# Patient Record
Sex: FEMALE | Race: WHITE | NOT HISPANIC OR LATINO | Employment: OTHER | URBAN - METROPOLITAN AREA
[De-identification: names, ages, dates, MRNs, and addresses within clinical notes are randomized per-mention and may not be internally consistent; named-entity substitution may affect disease eponyms.]

---

## 2021-01-15 DIAGNOSIS — Z23 NEED FOR VACCINATION: ICD-10-CM

## 2022-10-31 ENCOUNTER — HOSPITAL ENCOUNTER (OUTPATIENT)
Facility: MEDICAL CENTER | Age: 82
End: 2022-11-01
Attending: EMERGENCY MEDICINE | Admitting: HOSPITALIST
Payer: COMMERCIAL

## 2022-10-31 DIAGNOSIS — R55 SYNCOPE, UNSPECIFIED SYNCOPE TYPE: ICD-10-CM

## 2022-10-31 PROBLEM — E86.0 DEHYDRATION: Status: ACTIVE | Noted: 2022-10-31

## 2022-10-31 PROBLEM — N30.00 ACUTE CYSTITIS WITHOUT HEMATURIA: Status: ACTIVE | Noted: 2022-10-31

## 2022-10-31 LAB
ALBUMIN SERPL BCP-MCNC: 3.9 G/DL (ref 3.2–4.9)
ALBUMIN/GLOB SERPL: 1.5 G/DL
ALP SERPL-CCNC: 73 U/L (ref 30–99)
ALT SERPL-CCNC: <5 U/L (ref 2–50)
ANION GAP SERPL CALC-SCNC: 12 MMOL/L (ref 7–16)
APPEARANCE UR: ABNORMAL
AST SERPL-CCNC: 15 U/L (ref 12–45)
BACTERIA #/AREA URNS HPF: ABNORMAL /HPF
BASOPHILS # BLD AUTO: 1.1 % (ref 0–1.8)
BASOPHILS # BLD: 0.08 K/UL (ref 0–0.12)
BILIRUB SERPL-MCNC: 0.3 MG/DL (ref 0.1–1.5)
BILIRUB UR QL STRIP.AUTO: NEGATIVE
BUN SERPL-MCNC: 8 MG/DL (ref 8–22)
CALCIUM SERPL-MCNC: 9.2 MG/DL (ref 8.4–10.2)
CHLORIDE SERPL-SCNC: 103 MMOL/L (ref 96–112)
CO2 SERPL-SCNC: 23 MMOL/L (ref 20–33)
COLOR UR: YELLOW
CREAT SERPL-MCNC: 0.89 MG/DL (ref 0.5–1.4)
EKG IMPRESSION: NORMAL
EOSINOPHIL # BLD AUTO: 0.17 K/UL (ref 0–0.51)
EOSINOPHIL NFR BLD: 2.3 % (ref 0–6.9)
EPI CELLS #/AREA URNS HPF: ABNORMAL /HPF
ERYTHROCYTE [DISTWIDTH] IN BLOOD BY AUTOMATED COUNT: 45.6 FL (ref 35.9–50)
GFR SERPLBLD CREATININE-BSD FMLA CKD-EPI: 64 ML/MIN/1.73 M 2
GLOBULIN SER CALC-MCNC: 2.6 G/DL (ref 1.9–3.5)
GLUCOSE SERPL-MCNC: 105 MG/DL (ref 65–99)
GLUCOSE UR STRIP.AUTO-MCNC: NEGATIVE MG/DL
HCT VFR BLD AUTO: 40.2 % (ref 37–47)
HGB BLD-MCNC: 13 G/DL (ref 12–16)
HYALINE CASTS #/AREA URNS LPF: ABNORMAL /LPF
IMM GRANULOCYTES # BLD AUTO: 0.01 K/UL (ref 0–0.11)
IMM GRANULOCYTES NFR BLD AUTO: 0.1 % (ref 0–0.9)
KETONES UR STRIP.AUTO-MCNC: ABNORMAL MG/DL
LEUKOCYTE ESTERASE UR QL STRIP.AUTO: ABNORMAL
LYMPHOCYTES # BLD AUTO: 2.52 K/UL (ref 1–4.8)
LYMPHOCYTES NFR BLD: 34.2 % (ref 22–41)
MCH RBC QN AUTO: 29 PG (ref 27–33)
MCHC RBC AUTO-ENTMCNC: 32.3 G/DL (ref 33.6–35)
MCV RBC AUTO: 89.7 FL (ref 81.4–97.8)
MICRO URNS: ABNORMAL
MONOCYTES # BLD AUTO: 0.55 K/UL (ref 0–0.85)
MONOCYTES NFR BLD AUTO: 7.5 % (ref 0–13.4)
MUCOUS THREADS #/AREA URNS HPF: ABNORMAL /HPF
NEUTROPHILS # BLD AUTO: 4.03 K/UL (ref 2–7.15)
NEUTROPHILS NFR BLD: 54.8 % (ref 44–72)
NITRITE UR QL STRIP.AUTO: NEGATIVE
NRBC # BLD AUTO: 0 K/UL
NRBC BLD-RTO: 0 /100 WBC
PH UR STRIP.AUTO: 6 [PH] (ref 5–8)
PLATELET # BLD AUTO: 315 K/UL (ref 164–446)
PMV BLD AUTO: 8.8 FL (ref 9–12.9)
POTASSIUM SERPL-SCNC: 3.8 MMOL/L (ref 3.6–5.5)
PROT SERPL-MCNC: 6.5 G/DL (ref 6–8.2)
PROT UR QL STRIP: 30 MG/DL
RBC # BLD AUTO: 4.48 M/UL (ref 4.2–5.4)
RBC # URNS HPF: ABNORMAL /HPF
RBC UR QL AUTO: NEGATIVE
SODIUM SERPL-SCNC: 138 MMOL/L (ref 135–145)
SP GR UR STRIP.AUTO: 1.02
TROPONIN T SERPL-MCNC: <6 NG/L (ref 6–19)
WBC # BLD AUTO: 7.4 K/UL (ref 4.8–10.8)
WBC #/AREA URNS HPF: ABNORMAL /HPF

## 2022-10-31 PROCEDURE — 93005 ELECTROCARDIOGRAM TRACING: CPT

## 2022-10-31 PROCEDURE — 81001 URINALYSIS AUTO W/SCOPE: CPT

## 2022-10-31 PROCEDURE — 36415 COLL VENOUS BLD VENIPUNCTURE: CPT

## 2022-10-31 PROCEDURE — G0378 HOSPITAL OBSERVATION PER HR: HCPCS

## 2022-10-31 PROCEDURE — 99220 PR INITIAL OBSERVATION CARE,LEVL III: CPT | Performed by: HOSPITALIST

## 2022-10-31 PROCEDURE — 80053 COMPREHEN METABOLIC PANEL: CPT

## 2022-10-31 PROCEDURE — A9270 NON-COVERED ITEM OR SERVICE: HCPCS | Performed by: HOSPITALIST

## 2022-10-31 PROCEDURE — 700102 HCHG RX REV CODE 250 W/ 637 OVERRIDE(OP): Performed by: HOSPITALIST

## 2022-10-31 PROCEDURE — 85025 COMPLETE CBC W/AUTO DIFF WBC: CPT

## 2022-10-31 PROCEDURE — 84484 ASSAY OF TROPONIN QUANT: CPT

## 2022-10-31 PROCEDURE — 94760 N-INVAS EAR/PLS OXIMETRY 1: CPT

## 2022-10-31 PROCEDURE — 99285 EMERGENCY DEPT VISIT HI MDM: CPT

## 2022-10-31 RX ORDER — ESCITALOPRAM OXALATE 10 MG/1
10 TABLET ORAL DAILY
COMMUNITY

## 2022-10-31 RX ORDER — CEPHALEXIN 250 MG/1
500 CAPSULE ORAL ONCE
Status: DISCONTINUED | OUTPATIENT
Start: 2022-10-31 | End: 2022-10-31

## 2022-10-31 RX ORDER — BISACODYL 10 MG
10 SUPPOSITORY, RECTAL RECTAL
Status: DISCONTINUED | OUTPATIENT
Start: 2022-10-31 | End: 2022-11-01 | Stop reason: HOSPADM

## 2022-10-31 RX ORDER — FENOFIBRATE 54 MG/1
54 TABLET ORAL DAILY
COMMUNITY

## 2022-10-31 RX ORDER — POLYETHYLENE GLYCOL 3350 17 G/17G
1 POWDER, FOR SOLUTION ORAL
Status: DISCONTINUED | OUTPATIENT
Start: 2022-10-31 | End: 2022-11-01 | Stop reason: HOSPADM

## 2022-10-31 RX ORDER — LISINOPRIL 2.5 MG/1
2.5 TABLET ORAL DAILY
COMMUNITY

## 2022-10-31 RX ORDER — ENOXAPARIN SODIUM 100 MG/ML
40 INJECTION SUBCUTANEOUS DAILY
Status: DISCONTINUED | OUTPATIENT
Start: 2022-11-01 | End: 2022-11-01 | Stop reason: HOSPADM

## 2022-10-31 RX ORDER — OMEPRAZOLE 20 MG/1
20 CAPSULE, DELAYED RELEASE ORAL DAILY
COMMUNITY

## 2022-10-31 RX ORDER — ATORVASTATIN CALCIUM 10 MG/1
10 TABLET, FILM COATED ORAL NIGHTLY
COMMUNITY

## 2022-10-31 RX ORDER — AMOXICILLIN 250 MG
2 CAPSULE ORAL 2 TIMES DAILY
Status: DISCONTINUED | OUTPATIENT
Start: 2022-10-31 | End: 2022-11-01 | Stop reason: HOSPADM

## 2022-10-31 RX ORDER — ALENDRONATE SODIUM 35 MG/1
35 TABLET ORAL
COMMUNITY

## 2022-10-31 RX ORDER — NITROFURANTOIN 25; 75 MG/1; MG/1
100 CAPSULE ORAL 2 TIMES DAILY WITH MEALS
Status: DISCONTINUED | OUTPATIENT
Start: 2022-10-31 | End: 2022-10-31

## 2022-10-31 RX ORDER — CEPHALEXIN 250 MG/1
500 CAPSULE ORAL EVERY 12 HOURS
Status: DISCONTINUED | OUTPATIENT
Start: 2022-10-31 | End: 2022-11-01 | Stop reason: HOSPADM

## 2022-10-31 RX ORDER — ACETAMINOPHEN 325 MG/1
650 TABLET ORAL EVERY 6 HOURS PRN
Status: DISCONTINUED | OUTPATIENT
Start: 2022-10-31 | End: 2022-11-01 | Stop reason: HOSPADM

## 2022-10-31 RX ORDER — CEFDINIR 300 MG/1
300 CAPSULE ORAL EVERY 12 HOURS
Status: DISCONTINUED | OUTPATIENT
Start: 2022-10-31 | End: 2022-10-31

## 2022-10-31 RX ADMIN — CEPHALEXIN 500 MG: 250 CAPSULE ORAL at 17:08

## 2022-10-31 ASSESSMENT — FIBROSIS 4 INDEX
FIB4 SCORE: 1.885618083164126732
FIB4 SCORE: 1.885618083164126732

## 2022-10-31 ASSESSMENT — COGNITIVE AND FUNCTIONAL STATUS - GENERAL
DAILY ACTIVITIY SCORE: 24
SUGGESTED CMS G CODE MODIFIER MOBILITY: CH
MOBILITY SCORE: 24
SUGGESTED CMS G CODE MODIFIER DAILY ACTIVITY: CH

## 2022-10-31 ASSESSMENT — LIFESTYLE VARIABLES
CONSUMPTION TOTAL: NEGATIVE
AVERAGE NUMBER OF DAYS PER WEEK YOU HAVE A DRINK CONTAINING ALCOHOL: 0
EVER FELT BAD OR GUILTY ABOUT YOUR DRINKING: NO
TOTAL SCORE: 0
HOW MANY TIMES IN THE PAST YEAR HAVE YOU HAD 5 OR MORE DRINKS IN A DAY: 0
ON A TYPICAL DAY WHEN YOU DRINK ALCOHOL HOW MANY DRINKS DO YOU HAVE: 0
ALCOHOL_USE: NO
HAVE YOU EVER FELT YOU SHOULD CUT DOWN ON YOUR DRINKING: NO
TOTAL SCORE: 0
EVER HAD A DRINK FIRST THING IN THE MORNING TO STEADY YOUR NERVES TO GET RID OF A HANGOVER: NO
HAVE PEOPLE ANNOYED YOU BY CRITICIZING YOUR DRINKING: NO
TOTAL SCORE: 0

## 2022-10-31 ASSESSMENT — ENCOUNTER SYMPTOMS
FEVER: 0
MYALGIAS: 0
VOMITING: 0
EYE DISCHARGE: 0
SHORTNESS OF BREATH: 0
EYE REDNESS: 0
FLANK PAIN: 0
FOCAL WEAKNESS: 0
ABDOMINAL PAIN: 0
CHILLS: 0
BRUISES/BLEEDS EASILY: 0
COUGH: 0
NERVOUS/ANXIOUS: 0
STRIDOR: 0

## 2022-10-31 ASSESSMENT — PATIENT HEALTH QUESTIONNAIRE - PHQ9
SUM OF ALL RESPONSES TO PHQ9 QUESTIONS 1 AND 2: 0
2. FEELING DOWN, DEPRESSED, IRRITABLE, OR HOPELESS: NOT AT ALL
1. LITTLE INTEREST OR PLEASURE IN DOING THINGS: NOT AT ALL

## 2022-10-31 NOTE — ASSESSMENT & PLAN NOTE
EKG shows sinus rhythm with a rate of 63, there is no ST elevation, or significant ST depression, QTC is 426.  Orthostatic vital  Telemetry monitor  Echocardiography

## 2022-10-31 NOTE — ED PROVIDER NOTES
ED Provider Note    CHIEF COMPLAINT  Chief Complaint   Patient presents with    Near Syncopal     Patient had brief episode of lightheaded/dizziness around 1400 today while shopping at a store with her son.       HPI  Myrtlesamuel Burnett is a 82 y.o. female who presents with chief complaint of brief episode of loss of consciousness.  Patient reports that this occurred around 2 PM while she was shopping.  Patient reports that she suddenly became lightheaded, felt like she was going to pass out, sat down and upon her son checking in on how she was doing patient had completely lost consciousness.  Patient denies any associated palpitations, nausea or diaphoresis.  She denies any associated chest pain.  Patient denies any preceding headache.  She denies any associated focal weakness or numbness.  She denies any dysuria urgency or frequency.  She denies any associated back pain.  Per son, he believes she is dehydrated as she does not drink much water.  She is currently also very stressed as son's wife  a week ago.    REVIEW OF SYSTEMS  ROS    See HPI for further details. All other systems are negative.     PAST MEDICAL HISTORY       SOCIAL HISTORY  Social History     Tobacco Use    Smoking status: Not on file    Smokeless tobacco: Not on file   Substance and Sexual Activity    Alcohol use: Not on file    Drug use: Not on file    Sexual activity: Not on file       SURGICAL HISTORY  patient denies any surgical history    CURRENT MEDICATIONS  Home Medications    **Home medications have not yet been reviewed for this encounter**         ALLERGIES  No Known Allergies    PHYSICAL EXAM  Vitals:    10/31/22 1511   BP: (!) 140/64   Pulse: 75   Resp: 17   Temp: 36.8 °C (98.3 °F)   SpO2: 94%       Physical Exam  Constitutional:       Appearance: She is well-developed.   HENT:      Head: Normocephalic and atraumatic.   Eyes:      Conjunctiva/sclera: Conjunctivae normal.   Cardiovascular:      Rate and Rhythm: Normal rate and  regular rhythm.   Pulmonary:      Effort: Pulmonary effort is normal.      Breath sounds: Normal breath sounds.   Abdominal:      General: Bowel sounds are normal. There is no distension.      Palpations: Abdomen is soft.      Tenderness: There is no abdominal tenderness. There is no rebound.   Musculoskeletal:      Cervical back: Normal range of motion and neck supple.   Skin:     General: Skin is warm and dry.      Findings: No rash.   Neurological:      General: No focal deficit present.      Mental Status: She is alert and oriented to person, place, and time.      Comments: Distal and proximal strength 5/5 in upper and lower extremities. No dysmetria. No sensation deficits. No visual field deficits. Cranial nerves intact.        Psychiatric:         Behavior: Behavior normal.         DIAGNOSTIC STUDIES / PROCEDURES      LABS  Results for orders placed or performed during the hospital encounter of 10/31/22   CBC WITH DIFFERENTIAL   Result Value Ref Range    WBC 7.4 4.8 - 10.8 K/uL    RBC 4.48 4.20 - 5.40 M/uL    Hemoglobin 13.0 12.0 - 16.0 g/dL    Hematocrit 40.2 37.0 - 47.0 %    MCV 89.7 81.4 - 97.8 fL    MCH 29.0 27.0 - 33.0 pg    MCHC 32.3 (L) 33.6 - 35.0 g/dL    RDW 45.6 35.9 - 50.0 fL    Platelet Count 315 164 - 446 K/uL    MPV 8.8 (L) 9.0 - 12.9 fL    Neutrophils-Polys 54.80 44.00 - 72.00 %    Lymphocytes 34.20 22.00 - 41.00 %    Monocytes 7.50 0.00 - 13.40 %    Eosinophils 2.30 0.00 - 6.90 %    Basophils 1.10 0.00 - 1.80 %    Immature Granulocytes 0.10 0.00 - 0.90 %    Nucleated RBC 0.00 /100 WBC    Neutrophils (Absolute) 4.03 2.00 - 7.15 K/uL    Lymphs (Absolute) 2.52 1.00 - 4.80 K/uL    Monos (Absolute) 0.55 0.00 - 0.85 K/uL    Eos (Absolute) 0.17 0.00 - 0.51 K/uL    Baso (Absolute) 0.08 0.00 - 0.12 K/uL    Immature Granulocytes (abs) 0.01 0.00 - 0.11 K/uL    NRBC (Absolute) 0.00 K/uL   CMP   Result Value Ref Range    Sodium 138 135 - 145 mmol/L    Potassium 3.8 3.6 - 5.5 mmol/L    Chloride 103 96 - 112  mmol/L    Co2 23 20 - 33 mmol/L    Anion Gap 12.0 7.0 - 16.0    Glucose 105 (H) 65 - 99 mg/dL    Bun 8 8 - 22 mg/dL    Creatinine 0.89 0.50 - 1.40 mg/dL    Calcium 9.2 8.4 - 10.2 mg/dL    AST(SGOT) 15 12 - 45 U/L    ALT(SGPT) <5 2 - 50 U/L    Alkaline Phosphatase 73 30 - 99 U/L    Total Bilirubin 0.3 0.1 - 1.5 mg/dL    Albumin 3.9 3.2 - 4.9 g/dL    Total Protein 6.5 6.0 - 8.2 g/dL    Globulin 2.6 1.9 - 3.5 g/dL    A-G Ratio 1.5 g/dL   TROPONIN   Result Value Ref Range    Troponin T <6 6 - 19 ng/L   URINALYSIS    Specimen: Urine   Result Value Ref Range    Color Yellow     Character Hazy (A)     Specific Gravity 1.020 <1.035    Ph 6.0 5.0 - 8.0    Glucose Negative Negative mg/dL    Ketones Trace (A) Negative mg/dL    Protein 30 (A) Negative mg/dL    Bilirubin Negative Negative    Nitrite Negative Negative    Leukocyte Esterase Small (A) Negative    Occult Blood Negative Negative    Micro Urine Req Microscopic    URINE MICROSCOPIC (W/UA)   Result Value Ref Range    WBC 20-50 (A) /hpf    RBC 0-2 /hpf    Bacteria Rare (A) None /hpf    Epithelial Cells Few Few /hpf    Mucous Threads Few /hpf    Hyaline Cast 0-2 /lpf   ESTIMATED GFR   Result Value Ref Range    GFR (CKD-EPI) 64 >60 mL/min/1.73 m 2   EKG   Result Value Ref Range    Report       [facility] Emergency Dept[facility]    Test Date:  2022-10-31  Pt Name:    ASH NIÑO               Department: [facility]  MRN:        [id number]                      [facility]  Gender:     Female                       Technician: AT  :        [id number]                   Requested By:ER TRIAGE PROTOCOL  Order #:    [id number]                    Reading MD: Tomas Villegas MD    Measurements  Intervals                                Axis  Rate:       63                           P:          50  NY:         148                          QRS:        22  QRSD:       101                          T:          23  QT:         416  QTc:        426    Interpretive  Statements  Sinus rhythm  No previous ECG available for comparison  Electronically Signed On 10- 16:12:27 PDT by Tomas Villegas MD           RADIOLOGY  EC-ECHOCARDIOGRAM COMPLETE W/O CONT    (Results Pending)           COURSE & MEDICAL DECISION MAKING  Pertinent Labs & Imaging studies reviewed. (See chart for details)    Patient here with syncopal episode.  Unclear etiology.  The relatively sudden nature is concerning especially in the setting of patient's advanced age.  Patient is EKG is reassuring but certainly does not rule out cardiogenic syncope.  Patient neurologic exam is very reassuring, I believe a central nervous system cause of her symptoms is highly unlikely.  We will check basic labs including troponin.  Patient would likely benefit from admission for telemetry monitoring.  Basic labs are reassuring.  Patient case discussed with hospitalist who has agreed to admit.  Questionable urinary tract infection.  Will cover with Macrobid.  On further discussion with pharmacy, patient's GFR is not amenable for giving Macrobid and therefore will give Keflex.    FINAL IMPRESSION  1.  Syncope, urinary tract infection    Electronically signed by: Bakari Marin M.D., 10/31/2022 3:16 PM

## 2022-10-31 NOTE — ED TRIAGE NOTES
Patient presents via [facility] with reports of a brief episode of lightheaded/dizziness around 1400 today while at the [employer] store with her son. Patient is visiting from [facility]. Denies any LOC. Patient was assisted to the ground by her son. No injury. Patient currently denies symptoms and any pain.

## 2022-10-31 NOTE — H&P
Hospital Medicine History & Physical Note    Date of Service  10/31/2022    Primary Care Physician  Pcp Pt States None    Consultants  None     Code Status  Full Code    Chief Complaint  Chief Complaint   Patient presents with    Near Syncopal     Patient had brief episode of lightheaded/dizziness around 1400 today while shopping at a store with her son.     History of Presenting Illness  Myrtle Burnett is a 82 y.o. female with no significant past medical history who presented 10/31/2022 with syncope.  Patient had transient loss of consciousness while shopping around 2 PM on the day of admission.  The patient did feel lightheaded and dizzy before passing out.  She did not hit her head.  She denies having palpitations chest pain or shortness of breath prior to passing out.  Patient denies noticing any focal motor weakness or any other sensory changes.  Patient has not been drinking much according to patient's son, however, the patient has no nausea or vomiting.    I discussed the plan of care with emergency department physician, the patient and patient's son and grandson present at bedside in the emergency room.    Review of Systems  Review of Systems   Constitutional:  Negative for chills and fever.   Eyes:  Negative for discharge and redness.   Respiratory:  Negative for cough, shortness of breath and stridor.    Cardiovascular:  Negative for chest pain and leg swelling.        Syncope   Gastrointestinal:  Negative for abdominal pain and vomiting.   Genitourinary:  Negative for flank pain.   Musculoskeletal:  Negative for myalgias.   Skin: Negative.    Neurological:  Negative for focal weakness.   Endo/Heme/Allergies:  Does not bruise/bleed easily.   Psychiatric/Behavioral:  The patient is not nervous/anxious.      Past Medical History  A personal history of heart disease or diabetes    Surgical History  No prior surgeries    Family History  Heart disease in father    Social History  Not currently drinking  alcohol    Allergies  No Known Allergies    Medications  Prior to Admission Medications   Prescriptions Last Dose Informant Patient Reported? Taking?   alendronate (FOSAMAX) 35 MG tablet UNK at [facility] Patient's Home Pharmacy Yes Yes   Sig: Take 35 mg by mouth every 7 days.   atorvastatin (LIPITOR) 10 MG Tab UNK at [facility] Patient's Home Pharmacy Yes Yes   Sig: Take 10 mg by mouth every evening.   escitalopram (LEXAPRO) 10 MG Tab UNK at [facility] Patient's Home Pharmacy Yes Yes   Sig: Take 10 mg by mouth every day.   fenofibrate (TRICOR) 54 MG tablet UNK at [facility] Patient's Home Pharmacy Yes Yes   Sig: Take 54 mg by mouth every day.   lisinopril (PRINIVIL) 2.5 MG Tab UNK at [facility] Patient's Home Pharmacy Yes Yes   Sig: Take 2.5 mg by mouth every day.   omeprazole (PRILOSEC) 20 MG delayed-release capsule UNK at [facility] Patient's Home Pharmacy Yes Yes   Sig: Take 20 mg by mouth every day.      Facility-Administered Medications: None     Physical Exam  Temp:  [36.8 °C (98.3 °F)] 36.8 °C (98.3 °F)  Pulse:  [67-75] 74  Resp:  [13-17] 17  BP: (140)/(64) 140/64  SpO2:  [94 %-97 %] 97 %  Blood Pressure : (!) 140/64   Temperature: 36.8 °C (98.3 °F)   Pulse: 74   Respiration: 17   Pulse Oximetry: 97 %     Physical Exam  Constitutional:       General: She is not in acute distress.  HENT:      Head: Normocephalic and atraumatic.      Right Ear: External ear normal.      Left Ear: External ear normal.      Nose: No congestion or rhinorrhea.      Mouth/Throat:      Mouth: Mucous membranes are dry.      Pharynx: No oropharyngeal exudate or posterior oropharyngeal erythema.   Eyes:      General: No scleral icterus.        Right eye: No discharge.         Left eye: No discharge.      Conjunctiva/sclera: Conjunctivae normal.      Pupils: Pupils are equal, round, and reactive to light.   Cardiovascular:      Rate and Rhythm: Regular rhythm. Tachycardia present.      Heart sounds:     No friction rub. No gallop.   Pulmonary:      Effort: Pulmonary effort is  normal.   Abdominal:      General: Abdomen is flat. There is no distension.      Tenderness: There is no guarding.   Musculoskeletal:         General: No swelling.      Cervical back: Neck supple. No rigidity. No muscular tenderness.      Right lower leg: No edema.      Left lower leg: No edema.   Skin:     General: Skin is dry.      Capillary Refill: Capillary refill takes 2 to 3 seconds.      Coloration: Skin is not jaundiced or pale.      Findings: No bruising or erythema.   Neurological:      Mental Status: She is alert and oriented to person, place, and time.   Psychiatric:         Mood and Affect: Mood normal.         Judgment: Judgment normal.     Laboratory:  Recent Labs     10/31/22  1518   WBC 7.4   RBC 4.48   HEMOGLOBIN 13.0   HEMATOCRIT 40.2   MCV 89.7   MCH 29.0   MCHC 32.3*   RDW 45.6   PLATELETCT 315   MPV 8.8*     Recent Labs     10/31/22  1518   SODIUM 138   POTASSIUM 3.8   CHLORIDE 103   CO2 23   GLUCOSE 105*   BUN 8   CREATININE 0.89   CALCIUM 9.2     Recent Labs     10/31/22  1518   ASTSGOT 15   ALKPHOSPHAT 73   TBILIRUBIN 0.3   GLUCOSE 105*         No results for input(s): NTPROBNP in the last 72 hours.      Recent Labs     10/31/22  1518   TROPONINT <6     Imaging:  EC-ECHOCARDIOGRAM COMPLETE W/O CONT    (Results Pending)     I personally reviewed patient EKG shows sinus rhythm with a rate of 63, there is no ST elevation, or significant ST depression, QTC is 426.    Assessment/Plan:  Justification for Admission Status  I anticipate this patient is appropriate for observation status at this time because likely discharge after 1 midnight    Patient will need a bed on telemetry service, patient has syncope    Syncope- (present on admission)  Assessment & Plan  EKG shows sinus rhythm with a rate of 63, there is no ST elevation, or significant ST depression, QTC is 426.  Orthostatic vital  Telemetry monitor  Echocardiography     Dehydration- (present on admission)  Assessment & Plan  Likely  secondary to reduced oral intake.  Encourage oral intake as tolerated, antiemetics as needed.  Intravenous hydration until adequate oral intake is achieved    Acute cystitis without hematuria- (present on admission)  Assessment & Plan  Started on Keflex in the emergency room, I will continue with cefdinir for now follow on cultures and sensitivities    VTE prophylaxis: SCDs/TEDs and enoxaparin ppx

## 2022-10-31 NOTE — ED NOTES
Med Rec complete per pt's pharmacy  Allergies Reviewed    Pt unable to fully participate in interview

## 2022-10-31 NOTE — ASSESSMENT & PLAN NOTE
Started on Keflex in the emergency room, I will continue with cefdinir for now follow on cultures and sensitivities

## 2022-10-31 NOTE — ASSESSMENT & PLAN NOTE
Likely secondary to reduced oral intake.  Encourage oral intake as tolerated, antiemetics as needed.  Intravenous hydration until adequate oral intake is achieved

## 2022-11-01 ENCOUNTER — APPOINTMENT (OUTPATIENT)
Dept: CARDIOLOGY | Facility: MEDICAL CENTER | Age: 82
End: 2022-11-01
Attending: HOSPITALIST
Payer: COMMERCIAL

## 2022-11-01 ENCOUNTER — PHARMACY VISIT (OUTPATIENT)
Dept: PHARMACY | Facility: MEDICAL CENTER | Age: 82
End: 2022-11-01
Payer: COMMERCIAL

## 2022-11-01 VITALS
OXYGEN SATURATION: 94 % | HEIGHT: 62 IN | TEMPERATURE: 98.3 F | RESPIRATION RATE: 18 BRPM | DIASTOLIC BLOOD PRESSURE: 45 MMHG | BODY MASS INDEX: 20.24 KG/M2 | SYSTOLIC BLOOD PRESSURE: 136 MMHG | WEIGHT: 110.01 LBS | HEART RATE: 78 BPM

## 2022-11-01 LAB
ALBUMIN SERPL BCP-MCNC: 3.7 G/DL (ref 3.2–4.9)
ALBUMIN/GLOB SERPL: 1.5 G/DL
ALP SERPL-CCNC: 67 U/L (ref 30–99)
ALT SERPL-CCNC: <5 U/L (ref 2–50)
ANION GAP SERPL CALC-SCNC: 9 MMOL/L (ref 7–16)
AST SERPL-CCNC: 15 U/L (ref 12–45)
BILIRUB SERPL-MCNC: 0.4 MG/DL (ref 0.1–1.5)
BUN SERPL-MCNC: 9 MG/DL (ref 8–22)
CALCIUM SERPL-MCNC: 9.3 MG/DL (ref 8.4–10.2)
CHLORIDE SERPL-SCNC: 105 MMOL/L (ref 96–112)
CO2 SERPL-SCNC: 24 MMOL/L (ref 20–33)
CREAT SERPL-MCNC: 0.76 MG/DL (ref 0.5–1.4)
ERYTHROCYTE [DISTWIDTH] IN BLOOD BY AUTOMATED COUNT: 45.3 FL (ref 35.9–50)
GFR SERPLBLD CREATININE-BSD FMLA CKD-EPI: 77 ML/MIN/1.73 M 2
GLOBULIN SER CALC-MCNC: 2.5 G/DL (ref 1.9–3.5)
GLUCOSE SERPL-MCNC: 95 MG/DL (ref 65–99)
HCT VFR BLD AUTO: 38.9 % (ref 37–47)
HGB BLD-MCNC: 12.6 G/DL (ref 12–16)
LV EJECT FRACT  99904: 65
LV EJECT FRACT MOD 2C 99903: 69.49
LV EJECT FRACT MOD 4C 99902: 79.5
LV EJECT FRACT MOD BP 99901: 73.35
MAGNESIUM SERPL-MCNC: 2 MG/DL (ref 1.5–2.5)
MCH RBC QN AUTO: 28.9 PG (ref 27–33)
MCHC RBC AUTO-ENTMCNC: 32.4 G/DL (ref 33.6–35)
MCV RBC AUTO: 89.2 FL (ref 81.4–97.8)
PLATELET # BLD AUTO: 314 K/UL (ref 164–446)
PMV BLD AUTO: 8.9 FL (ref 9–12.9)
POTASSIUM SERPL-SCNC: 4.3 MMOL/L (ref 3.6–5.5)
PROT SERPL-MCNC: 6.2 G/DL (ref 6–8.2)
RBC # BLD AUTO: 4.36 M/UL (ref 4.2–5.4)
SODIUM SERPL-SCNC: 138 MMOL/L (ref 135–145)
WBC # BLD AUTO: 7.6 K/UL (ref 4.8–10.8)

## 2022-11-01 PROCEDURE — 700102 HCHG RX REV CODE 250 W/ 637 OVERRIDE(OP): Performed by: HOSPITALIST

## 2022-11-01 PROCEDURE — G0378 HOSPITAL OBSERVATION PER HR: HCPCS

## 2022-11-01 PROCEDURE — 36415 COLL VENOUS BLD VENIPUNCTURE: CPT

## 2022-11-01 PROCEDURE — 85027 COMPLETE CBC AUTOMATED: CPT

## 2022-11-01 PROCEDURE — 83735 ASSAY OF MAGNESIUM: CPT

## 2022-11-01 PROCEDURE — A9270 NON-COVERED ITEM OR SERVICE: HCPCS | Performed by: HOSPITALIST

## 2022-11-01 PROCEDURE — 99217 PR OBSERVATION CARE DISCHARGE: CPT | Performed by: INTERNAL MEDICINE

## 2022-11-01 PROCEDURE — 93306 TTE W/DOPPLER COMPLETE: CPT

## 2022-11-01 PROCEDURE — 93306 TTE W/DOPPLER COMPLETE: CPT | Mod: 26 | Performed by: INTERNAL MEDICINE

## 2022-11-01 PROCEDURE — 80053 COMPREHEN METABOLIC PANEL: CPT

## 2022-11-01 PROCEDURE — RXMED WILLOW AMBULATORY MEDICATION CHARGE: Performed by: INTERNAL MEDICINE

## 2022-11-01 RX ORDER — CEPHALEXIN 500 MG/1
500 CAPSULE ORAL EVERY 12 HOURS
Qty: 4 CAPSULE | Refills: 0 | Status: SHIPPED | OUTPATIENT
Start: 2022-11-01 | End: 2022-11-03

## 2022-11-01 RX ADMIN — CEPHALEXIN 500 MG: 250 CAPSULE ORAL at 05:52

## 2022-11-01 RX ADMIN — ACETAMINOPHEN 650 MG: 325 TABLET, FILM COATED ORAL at 07:55

## 2022-11-01 RX ADMIN — SENNOSIDES AND DOCUSATE SODIUM 2 TABLET: 50; 8.6 TABLET ORAL at 05:52

## 2022-11-01 NOTE — PROGRESS NOTES
Discharge instructions given and discussed, signed copy in chart. Pt verbalized understanding and all questions answered. New prescriptions discussed. Pt discharged home in stable condition via wheelchair escorted by family. Personal belongings taken home with patient. IV removed and tolerated well. Tele box removed, monitor tech notified.

## 2022-11-01 NOTE — CARE PLAN
The patient is Stable - Low risk of patient condition declining or worsening    Shift Goals  Clinical Goals: ECHO  Patient Goals: rest    Progress made toward(s) clinical / shift goals:    Problem: Knowledge Deficit - Standard  Goal: Patient and family/care givers will demonstrate understanding of plan of care, disease process/condition, diagnostic tests and medications  Outcome: Progressing  Note: Plan of care, medications, and diagnostic tests discussed with patient and son at bedside. All questions answered at this time.        Patient is not progressing towards the following goals:NA       Home

## 2022-11-01 NOTE — PROGRESS NOTES
Received bedside report from JUAN CARLOS Horton, pt care assumed. VSS, pt assessment complete. Pt A&Ox4, c/o 5/10 head pain at this time. POC discussed with pt and verbalizes no questions. Pt denies any additional needs at this time. Bed locked and in lowest position, bed alarm off. Pt educated on fall risk and verbalized understanding, call light within reach, hourly rounding initiated.

## 2022-11-01 NOTE — PROGRESS NOTES
Received report from JUAN CARLOS Iqbal. Patient care assumed and assessment completed. Patient's son at bedside and patient resting in bed. Patient has no additional needs and all questions answered at this time.

## 2022-11-01 NOTE — PROGRESS NOTES
Telemetry Shift Summary    Rhythm SR  HR Range 63-82  Measurements 0.18/0.08/0.40        Normal Values  Rhythm SR  HR Range    Measurements 0.12-0.20 / 0.06-0.10  / 0.30-0.52

## 2022-11-01 NOTE — DISCHARGE INSTRUCTIONS
Please follow up with your PCP as soon as possible. Please use urgent and emergency care as needed until able to follow up with PCP.

## 2022-11-01 NOTE — DISCHARGE SUMMARY
Discharge Summary    CHIEF COMPLAINT ON ADMISSION  Chief Complaint   Patient presents with    Near Syncopal     Patient had brief episode of lightheaded/dizziness around 1400 today while shopping at a store with her son.       Reason for Admission  EMS     Admission Date  10/31/2022    CODE STATUS  Full Code    HPI & HOSPITAL COURSE  Myrtle Burnett is a 82 y.o. female with heart disease, and diabetes, admitted 10/31/2022 with syncopal episode, and transient loss of consciousness while shopping.  She did not have any focal motor weakness or sensory changes.  Initial labs were unremarkable.  Troponin was negative.  Electrolytes and renal function are normal.  EKG showed no dynamic ischemic changes.  She did have evidence of UTI on urinalysis, for which she was started on Keflex.  Orthostatic vital signs were negative.  She was monitored on telemetry, without any evidence of arrhythmias.  Echocardiogram was also obtained which showed normal left ventricular systolic function, and no evidence of valvular abnormality.    She remained hemodynamically stable and afebrile.  Her syncope was felt to be related to dehydration. I counseled her extensively regarding keeping adequate oral fluid intake, and to drink at least 1 L of water a day.  I also counseled her against excessive soda intake.    I have personally seen and examined the patient on the day of discharge. With her clinical improvement, she was deemed ready to discharge from the hospital as she did not have any further hospitalization needs. Patient felt comfortable going home. The discharge plan was discussed with the patient, with which she was agreeable to.     Therefore, she is discharged in good and stable condition to home with close outpatient follow-up.      Discharge Date  11/1/2022    FOLLOW UP ITEMS POST DISCHARGE  -She will follow-up with her PCP.  -She is counseled extensively on maintaining adequate oral fluid intake.  - counseled to seek immediate  medical attention, or return to the ED for recurrent or worsening symptoms.      DISCHARGE DIAGNOSES  Principal Problem:    Syncope due to orthostasis/dehydration POA: Yes  Active Problems:    Acute cystitis without hematuria POA: Yes    Dehydration POA: Yes  Resolved Problems:    * No resolved hospital problems. *      FOLLOW UP  No future appointments.  No follow-up provider specified.    MEDICATIONS ON DISCHARGE     Medication List        START taking these medications        Instructions   cephALEXin 500 MG Caps  Commonly known as: KEFLEX   Take 1 Capsule by mouth every 12 hours for 2 days.  Dose: 500 mg            CONTINUE taking these medications        Instructions   alendronate 35 MG tablet  Commonly known as: FOSAMAX   Take 35 mg by mouth every 7 days.  Dose: 35 mg     atorvastatin 10 MG Tabs  Commonly known as: LIPITOR   Take 10 mg by mouth every evening.  Dose: 10 mg     escitalopram 10 MG Tabs  Commonly known as: Lexapro   Take 10 mg by mouth every day.  Dose: 10 mg     fenofibrate 54 MG tablet  Commonly known as: TRICOR   Take 54 mg by mouth every day.  Dose: 54 mg     lisinopril 2.5 MG Tabs  Commonly known as: PRINIVIL   Take 2.5 mg by mouth every day.  Dose: 2.5 mg     omeprazole 20 MG delayed-release capsule  Commonly known as: PRILOSEC   Take 20 mg by mouth every day.  Dose: 20 mg              Allergies  No Known Allergies    DIET  Orders Placed This Encounter   Procedures    Diet Order Diet: Cardiac     Standing Status:   Standing     Number of Occurrences:   1     Order Specific Question:   Diet:     Answer:   Cardiac [6]       ACTIVITY  As tolerated.  Weight bearing as tolerated    CONSULTATIONS  None    PROCEDURES  None    LABORATORY  Lab Results   Component Value Date    SODIUM 138 11/01/2022    POTASSIUM 4.3 11/01/2022    CHLORIDE 105 11/01/2022    CO2 24 11/01/2022    GLUCOSE 95 11/01/2022    BUN 9 11/01/2022    CREATININE 0.76 11/01/2022        Lab Results   Component Value Date    WBC 7.6  11/01/2022    HEMOGLOBIN 12.6 11/01/2022    HEMATOCRIT 38.9 11/01/2022    PLATELETCT 314 11/01/2022        Total time of the discharge process = 40 minutes.

## 2022-11-01 NOTE — PROGRESS NOTES
4 Eyes Skin Assessment Completed by Rasheed, RN and  , RN.    Head WDL  Ears WDL  Nose WDL  Mouth WDL  Neck WDL  Breast/Chest WDL  Shoulder Blades WDL  Spine WDL  (R) Arm/Elbow/Hand WDL  (L) Arm/Elbow/Hand WDL  Abdomen WDL  Groin WDL  Scrotum/Coccyx/Buttocks WDL  (R) Leg WDL  (L) Leg WDL  (R) Heel/Foot/Toe WDL  (L) Heel/Foot/Toe WDL      Interventions In Place     Possible Skin Injury     Pictures Uploaded Into Epic   Wound Consult Placed   RN Wound Prevention Protocol Ordered

## 2023-09-29 ENCOUNTER — OFFICE VISIT (OUTPATIENT)
Dept: URGENT CARE | Facility: PHYSICIAN GROUP | Age: 83
End: 2023-09-29
Payer: COMMERCIAL

## 2023-09-29 ENCOUNTER — APPOINTMENT (OUTPATIENT)
Dept: RADIOLOGY | Facility: IMAGING CENTER | Age: 83
End: 2023-09-29
Attending: FAMILY MEDICINE
Payer: COMMERCIAL

## 2023-09-29 VITALS
OXYGEN SATURATION: 95 % | BODY MASS INDEX: 20.5 KG/M2 | WEIGHT: 111.4 LBS | RESPIRATION RATE: 18 BRPM | HEIGHT: 62 IN | TEMPERATURE: 98.2 F | SYSTOLIC BLOOD PRESSURE: 128 MMHG | HEART RATE: 81 BPM | DIASTOLIC BLOOD PRESSURE: 72 MMHG

## 2023-09-29 DIAGNOSIS — S99.912A INJURY OF LEFT ANKLE, INITIAL ENCOUNTER: ICD-10-CM

## 2023-09-29 DIAGNOSIS — S93.402A SPRAIN OF LEFT ANKLE, UNSPECIFIED LIGAMENT, INITIAL ENCOUNTER: ICD-10-CM

## 2023-09-29 PROCEDURE — 3078F DIAST BP <80 MM HG: CPT | Performed by: FAMILY MEDICINE

## 2023-09-29 PROCEDURE — 3074F SYST BP LT 130 MM HG: CPT | Performed by: FAMILY MEDICINE

## 2023-09-29 PROCEDURE — 99203 OFFICE O/P NEW LOW 30 MIN: CPT | Performed by: FAMILY MEDICINE

## 2023-09-29 PROCEDURE — 73610 X-RAY EXAM OF ANKLE: CPT | Mod: TC,LT | Performed by: PHYSICIAN ASSISTANT

## 2023-09-29 RX ORDER — ESCITALOPRAM OXALATE 5 MG/1
5 TABLET ORAL DAILY
COMMUNITY
Start: 2023-07-28

## 2023-09-29 RX ORDER — DONEPEZIL HYDROCHLORIDE 5 MG/1
5 TABLET, FILM COATED ORAL
COMMUNITY
Start: 2023-08-29

## 2023-09-29 ASSESSMENT — ENCOUNTER SYMPTOMS
ROS SKIN COMMENTS: NO ABRASION OR LACERATION
SENSORY CHANGE: 0
FOCAL WEAKNESS: 0

## 2023-09-29 ASSESSMENT — FIBROSIS 4 INDEX: FIB4 SCORE: 1.91

## 2023-09-29 NOTE — PROGRESS NOTES
"Subjective     Myrtle Burnett is a 83 y.o. female who presents with Foot Injury (Left foot,redness,swollen x 2week)            2 weeks inversion injury left ankle.  Swelling persists.  Ambulatory with mild limp.  No prior injury.  Some relief with OTC medications.  No other aggravating alleviating factors.        Review of Systems   Skin:         No abrasion or laceration     Neurological:  Negative for sensory change and focal weakness.              Objective     /72 (BP Location: Right arm, Patient Position: Sitting, BP Cuff Size: Adult)   Pulse 81   Temp 36.8 °C (98.2 °F) (Temporal)   Resp 18   Ht 1.575 m (5' 2\")   Wt 50.5 kg (111 lb 6.4 oz)   SpO2 95%   BMI 20.38 kg/m²      Physical Exam  Constitutional:       Appearance: Normal appearance.   Musculoskeletal:      Comments: Lankle: tender lateral malleolus.  Laxity with anterior drawer.  Lateral soft tissue swelling. No other point bony tenderness of ankle, foot, or leg. Distal neuro/vascular intact. Skin intact.        Skin:     General: Skin is warm and dry.      Findings: No rash.   Neurological:      Mental Status: She is alert.                             Assessment & Plan      Xray: no fracture or dislocation per radiology     1. Injury of left ankle, initial encounter  DX-ANKLE 3+ VIEWS LEFT      2. Sprain of left ankle, unspecified ligament, initial encounter          Differential diagnosis, natural history, supportive care, and indications for immediate follow-up were discussed.     There is some ankle laxity on physical exam.  She lives in [place] and will seek follow-up orthopedic care there.    Brace placed.  Ice and Tylenol as needed.        "

## 2024-10-30 ENCOUNTER — APPOINTMENT (OUTPATIENT)
Dept: LAB | Facility: MEDICAL CENTER | Age: 84
End: 2024-10-30

## 2024-10-30 PROBLEM — K59.01 SLOW TRANSIT CONSTIPATION: Chronic | Status: ACTIVE | Noted: 2024-10-29

## 2024-10-30 PROBLEM — M81.0 AGE-RELATED OSTEOPOROSIS WITHOUT CURRENT PATHOLOGICAL FRACTURE: Chronic | Status: ACTIVE | Noted: 2024-10-29

## 2024-10-30 PROBLEM — G30.0 MODERATE EARLY ONSET ALZHEIMER'S DEMENTIA WITH ANXIETY (HCC): Status: ACTIVE | Noted: 2024-10-29

## 2024-10-30 PROBLEM — F02.B4 MODERATE EARLY ONSET ALZHEIMER'S DEMENTIA WITH ANXIETY (HCC): Status: ACTIVE | Noted: 2024-10-29

## 2024-10-30 PROBLEM — N39.3 STRESS INCONTINENCE: Chronic | Status: ACTIVE | Noted: 2024-10-29

## 2024-10-30 PROBLEM — G30.0 MODERATE EARLY ONSET ALZHEIMER'S DEMENTIA WITH ANXIETY (HCC): Chronic | Status: ACTIVE | Noted: 2024-10-29

## 2024-10-30 PROBLEM — M81.0 AGE-RELATED OSTEOPOROSIS WITHOUT CURRENT PATHOLOGICAL FRACTURE: Status: ACTIVE | Noted: 2024-10-29

## 2024-10-30 PROBLEM — K59.01 SLOW TRANSIT CONSTIPATION: Status: ACTIVE | Noted: 2024-10-29

## 2024-10-30 PROBLEM — R12 HEARTBURN: Status: ACTIVE | Noted: 2024-10-29

## 2024-10-30 PROBLEM — Z71.0 DISCUSSION ABOUT ADVANCE CARE PLANNING HELD WITH FAMILY MEMBER: Status: ACTIVE | Noted: 2024-10-29

## 2024-10-30 PROBLEM — R12 HEARTBURN: Chronic | Status: ACTIVE | Noted: 2024-10-29

## 2024-10-30 PROBLEM — E78.2 MIXED DYSLIPIDEMIA: Status: ACTIVE | Noted: 2024-10-29

## 2024-10-30 PROBLEM — I10 ESSENTIAL HYPERTENSION: Chronic | Status: ACTIVE | Noted: 2024-10-29

## 2024-10-30 PROBLEM — Z11.1 SCREENING FOR TUBERCULOSIS: Chronic | Status: ACTIVE | Noted: 2024-10-29

## 2024-10-30 PROBLEM — Z71.0 DISCUSSION ABOUT ADVANCE CARE PLANNING HELD WITH FAMILY MEMBER: Chronic | Status: ACTIVE | Noted: 2024-10-29

## 2024-10-30 PROBLEM — K08.109 NO NATURAL TEETH: Chronic | Status: ACTIVE | Noted: 2024-10-29

## 2024-10-30 PROBLEM — I10 ESSENTIAL HYPERTENSION: Status: ACTIVE | Noted: 2024-10-29

## 2024-10-30 PROBLEM — H90.3 BILATERAL SENSORINEURAL HEARING LOSS: Chronic | Status: ACTIVE | Noted: 2024-10-29

## 2024-10-30 PROBLEM — H90.3 BILATERAL SENSORINEURAL HEARING LOSS: Status: ACTIVE | Noted: 2024-10-29

## 2024-10-30 PROBLEM — N39.3 STRESS INCONTINENCE: Status: ACTIVE | Noted: 2024-10-29

## 2024-10-30 PROBLEM — E78.2 MIXED DYSLIPIDEMIA: Chronic | Status: ACTIVE | Noted: 2024-10-29

## 2024-10-30 PROBLEM — F02.B4 MODERATE EARLY ONSET ALZHEIMER'S DEMENTIA WITH ANXIETY (HCC): Chronic | Status: ACTIVE | Noted: 2024-10-29

## 2024-10-30 PROBLEM — R29.6 REPEATED FALLS: Chronic | Status: ACTIVE | Noted: 2024-10-29

## 2024-10-30 PROBLEM — K08.109 NO NATURAL TEETH: Status: ACTIVE | Noted: 2024-10-29

## 2024-10-30 PROBLEM — F02.B3 MODERATE EARLY ONSET ALZHEIMER'S DEMENTIA WITH MOOD DISTURBANCE (HCC): Chronic | Status: ACTIVE | Noted: 2024-10-29

## 2024-10-30 PROBLEM — Z11.1 SCREENING FOR TUBERCULOSIS: Status: ACTIVE | Noted: 2024-10-29

## 2024-10-30 PROBLEM — F02.B3 MODERATE EARLY ONSET ALZHEIMER'S DEMENTIA WITH MOOD DISTURBANCE (HCC): Status: ACTIVE | Noted: 2024-10-29

## 2024-10-30 PROBLEM — R29.6 REPEATED FALLS: Status: ACTIVE | Noted: 2024-10-29

## 2024-12-25 PROBLEM — I95.2 HYPOTENSION DUE TO MEDICATION: Chronic | Status: ACTIVE | Noted: 2024-12-17

## 2024-12-25 PROBLEM — N39.0 RECURRENT UTI: Chronic | Status: ACTIVE | Noted: 2024-12-17

## 2024-12-25 PROBLEM — F33.41 DEPRESSION, MAJOR, RECURRENT, IN PARTIAL REMISSION (HCC): Chronic | Status: ACTIVE | Noted: 2024-12-17

## 2024-12-25 PROBLEM — I95.2 HYPOTENSION DUE TO MEDICATION: Status: ACTIVE | Noted: 2024-12-17

## 2024-12-25 PROBLEM — F33.41 DEPRESSION, MAJOR, RECURRENT, IN PARTIAL REMISSION (HCC): Status: ACTIVE | Noted: 2024-12-17

## 2024-12-25 PROBLEM — N39.0 RECURRENT UTI: Status: ACTIVE | Noted: 2024-12-17

## 2025-02-22 PROBLEM — E86.0 DEHYDRATION: Status: RESOLVED | Noted: 2022-10-31 | Resolved: 2025-02-22

## 2025-02-22 PROBLEM — N30.00 ACUTE CYSTITIS WITHOUT HEMATURIA: Status: RESOLVED | Noted: 2022-10-31 | Resolved: 2025-02-22

## 2025-03-23 PROBLEM — R51.9 MILD HEADACHE: Chronic | Status: ACTIVE | Noted: 2025-03-18

## 2025-03-23 PROBLEM — R51.9 MILD HEADACHE: Status: ACTIVE | Noted: 2025-03-18

## 2025-05-01 PROBLEM — R55 SYNCOPE: Status: RESOLVED | Noted: 2022-10-31 | Resolved: 2025-05-01

## 2025-06-23 ENCOUNTER — OFFICE VISIT (OUTPATIENT)
Dept: URGENT CARE | Facility: PHYSICIAN GROUP | Age: 85
End: 2025-06-23
Payer: COMMERCIAL

## 2025-06-23 VITALS
TEMPERATURE: 98.1 F | WEIGHT: 114 LBS | BODY MASS INDEX: 23.83 KG/M2 | OXYGEN SATURATION: 93 % | DIASTOLIC BLOOD PRESSURE: 68 MMHG | SYSTOLIC BLOOD PRESSURE: 110 MMHG | HEART RATE: 76 BPM | RESPIRATION RATE: 16 BRPM

## 2025-06-23 DIAGNOSIS — L08.9 SKIN INFECTION: Primary | ICD-10-CM

## 2025-06-23 PROCEDURE — 3078F DIAST BP <80 MM HG: CPT | Performed by: NURSE PRACTITIONER

## 2025-06-23 PROCEDURE — 99213 OFFICE O/P EST LOW 20 MIN: CPT | Performed by: NURSE PRACTITIONER

## 2025-06-23 PROCEDURE — 3074F SYST BP LT 130 MM HG: CPT | Performed by: NURSE PRACTITIONER

## 2025-06-23 RX ORDER — CEPHALEXIN 500 MG/1
500 CAPSULE ORAL 4 TIMES DAILY
Qty: 28 CAPSULE | Refills: 0 | Status: SHIPPED | OUTPATIENT
Start: 2025-06-23 | End: 2025-06-30

## 2025-06-23 RX ORDER — SULFAMETHOXAZOLE AND TRIMETHOPRIM 800; 160 MG/1; MG/1
TABLET ORAL
COMMUNITY
Start: 2025-06-18

## 2025-06-23 ASSESSMENT — ENCOUNTER SYMPTOMS
CHILLS: 0
FOCAL WEAKNESS: 0
TINGLING: 0
FEVER: 0
SENSORY CHANGE: 0

## 2025-06-23 NOTE — PROGRESS NOTES
Subjective     Myrtle Burnett is a 85 y.o. female who presents with Bug Bite (L side, painful to the touch x1wk)            HPI  New problem.  Patient is an 85-year-old female who presented lesion to the back of her left upper arm that was noticed in the past week.  The area is red and raised.  They initially thought it was an insect bite however her daughter does not believe it is that either.  She was seen by a physician at her care home and he prescribed Bactrim which she has been on since this past Thursday with no relief of symptoms.    Codeine and Penicillins  Medications Ordered Prior to Encounter[1]  Social History     Socioeconomic History    Marital status:      Spouse name: Not on file    Number of children: 4    Years of education: Not on file    Highest education level: Not on file   Occupational History    Occupation: Dealer/   Tobacco Use    Smoking status: Never    Smokeless tobacco: Never   Vaping Use    Vaping status: Never Used   Substance and Sexual Activity    Alcohol use: Not on file    Drug use: Never    Sexual activity: Not on file   Other Topics Concern    Not on file   Social History Narrative    Not on file     Social Drivers of Health     Financial Resource Strain: Not on file   Food Insecurity: Not on file   Transportation Needs: Not on file   Physical Activity: Not on file   Stress: Not on file   Social Connections: Not on file   Intimate Partner Violence: Not on file   Housing Stability: Not on file     Breast Cancer-related family history is not on file.      Review of Systems   Constitutional:  Negative for chills, fever and malaise/fatigue.   Skin:         Skin lesion left upper arm   Neurological:  Negative for tingling, sensory change and focal weakness.              Objective     /68 (BP Location: Right arm, Patient Position: Sitting, BP Cuff Size: Adult)   Pulse 76   Temp 36.7 °C (98.1 °F) (Temporal)   Resp 16   Wt 51.7 kg (114 lb)   SpO2 93%    BMI 23.83 kg/m²      Physical Exam  Cardiovascular:      Rate and Rhythm: Normal rate and regular rhythm.      Heart sounds: No murmur heard.  Pulmonary:      Effort: Pulmonary effort is normal.      Breath sounds: Normal breath sounds.   Musculoskeletal:         General: Normal range of motion.   Skin:     General: Skin is warm and dry.      Findings: Erythema present.      Comments: Erythematous firm raised lesion to her upper left arm dorsally.   Neurological:      General: No focal deficit present.      Mental Status: She is alert and oriented to person, place, and time.                                  Assessment & Plan  Skin infection    Orders:    Referral to Dermatology    cephALEXin (KEFLEX) 500 MG Cap; Take 1 Capsule by mouth 4 times a day for 7 days.    Patient will be given a referral to dermatology as I do not believe that this is an abscess or a bug bite.  She has not had any response to sulfa antibiotics so I am switching her to cephalexin and she is to finish the Bactrim as well.  Concern is this may be a skin cancer.                 [1]   Current Outpatient Medications on File Prior to Visit   Medication Sig Dispense Refill    sulfamethoxazole-trimethoprim (BACTRIM DS) 800-160 MG tablet       omeprazole (PRILOSEC) 20 MG delayed-release capsule Take 1 Capsule by mouth every day. Indications: Gastroesophageal Reflux Disease, Heartburn 30 Capsule 3    alendronate (FOSAMAX) 35 MG tablet Take 1 Tablet by mouth every 7 days. With a full glass of water on an empty stomach and stay upright at least 30 minutes 4 Tablet 11    D3-1000 25 MCG (1000 UT) Cap TAKE 1 CAPSULE BY MOUTH ONCE DAILY FOR OSTEOPOROSIS, VITAMIN D DEFICENCY 30 Capsule 10    escitalopram (LEXAPRO) 5 MG tablet TAKE 1 TABLET BY MOUTH ONCE DAILY 30 Tablet 10    acetaminophen (TYLENOL) 500 MG Tab Take 1 Tablet by mouth every 6 hours as needed for Mild Pain or Moderate Pain (muscle, joint pain or headache). 30 Tablet 2    docusate sodium  (COLACE) 100 MG Cap Take 100 mg by mouth 1 time a day as needed for Constipation. Indications: Constipation      zinc oxide (BOUDREAUXS BUTT PASTE) 40 % Ointment Apply 1 Application topically 3 times a day. Apply pea sized amount of ointment to buttocks and gluteal folds including skin abrasion on the left buttock (Patient not taking: Reported on 6/23/2025) 113 g 3    Misc Natural Products (MAGIC MUSHROOM MIX PO) Take 2 Capsules by mouth every day. Family to provide supply for nutrition      pyrithione zinc (SELSUN BLUE SALON) 1 % shampoo Apply 1 Application topically every 7 days. To scalp and hair during shower for scalp dandruff (Patient not taking: Reported on 6/23/2025) 325 mL 5     No current facility-administered medications on file prior to visit.